# Patient Record
Sex: FEMALE | Race: BLACK OR AFRICAN AMERICAN | NOT HISPANIC OR LATINO | Employment: UNEMPLOYED | URBAN - METROPOLITAN AREA
[De-identification: names, ages, dates, MRNs, and addresses within clinical notes are randomized per-mention and may not be internally consistent; named-entity substitution may affect disease eponyms.]

---

## 2018-06-13 ENCOUNTER — OFFICE VISIT (OUTPATIENT)
Dept: OBSTETRICS AND GYNECOLOGY | Facility: CLINIC | Age: 23
End: 2018-06-13

## 2018-06-13 VITALS
DIASTOLIC BLOOD PRESSURE: 70 MMHG | HEART RATE: 84 BPM | HEIGHT: 62 IN | BODY MASS INDEX: 29.26 KG/M2 | SYSTOLIC BLOOD PRESSURE: 109 MMHG | WEIGHT: 159 LBS

## 2018-06-13 DIAGNOSIS — Z30.09 BIRTH CONTROL COUNSELING: Primary | ICD-10-CM

## 2018-06-13 PROCEDURE — 99213 OFFICE O/P EST LOW 20 MIN: CPT | Performed by: OBSTETRICS & GYNECOLOGY

## 2018-06-13 NOTE — PROGRESS NOTES
"Subjective   Ladonna Gonzalez is a 23 y.o. female.   CC: Pt here for bc consult. Pt would like Nexplanon.  History of Present Illness   Pt here for bc consult. Pt would like Nexplanon.  She has not been on any birth control since the delivery of her last child about 2 years ago.  She previously only took birth control pills for about 1 month before that.  Patient thinks that she would like to have the Nexplanon device.  She does not have any) or relatives that use the Nexplanon.  She reports that her periods are regular.  Her flow is moderate.  Her pain is minimal with her menstrual cycle.  She does not think that she would do well with taking pills every day, and she does not want to have to come into the office frequently such as for Depo-Provera shots.    The following portions of the patient's history were reviewed and updated as appropriate: allergies, current medications, past family history, past medical history, past social history, past surgical history and problem list.    Review of Systems  General: No fever or chills  Abdomen: No nausea, vomiting, constipation or diarrhea  : No dysuria, no hematuria  Skin: No rashes  Lymph: No swelling  Neuro: No parathesia, no weakness  Psych: Normal though content, no hallucinations, no SI/HI    Objective   Physical Exam  Vitals:    06/13/18 1344   BP: 109/70   Pulse: 84   Weight: 72.1 kg (159 lb)   Height: 157.5 cm (62\")   Patient's last menstrual period was 05/21/2018 (exact date).   Gen: No acute distress, awake and oriented times three  Pelvic:  Deferred  Psych: Good judgement and insight, normal affect and mood      Assessment/Plan   Diagnoses and all orders for this visit:    Birth control counseling    Various forms of contraception were discussed.  The patient has no stitches in the Nexplanon.  The risks, benefits, and side effects were discussed at length.  We also discussed the irregular bleeding profile that is typically seen with the patient sees Nexplanon. "  Educational handouts have been provided to the patient.  She'll like to proceed with planning for Nexplanon insertion.  We will obtain prior authorization from her insurance carrier.  I explained the need to insert the Nexplanon within 7 days to the start of her menstrual cycle.  She verbalizes understanding.  Patient will be due for a Pap smear around July 2019.    I spent 12 out of 15 minutes with the patient in face to face counseling of the above issues.

## 2018-06-28 ENCOUNTER — TELEPHONE (OUTPATIENT)
Dept: OBSTETRICS AND GYNECOLOGY | Facility: CLINIC | Age: 23
End: 2018-06-28

## 2018-06-28 NOTE — TELEPHONE ENCOUNTER
Tried calling pt back. Pt not excepting calls right now. Need to inform pt that her Nexplanon is at [facility]. They will call her to confirm shipping. Pt will need to authorize them to ship us her device to our office. SIDNEY

## 2018-06-28 NOTE — TELEPHONE ENCOUNTER
----- Message from Kimberly Chacon sent at 6/28/2018 12:47 PM EDT -----  Patient was calling to check the status of her Nexplanon.

## 2018-08-07 ENCOUNTER — OFFICE VISIT (OUTPATIENT)
Dept: OBSTETRICS AND GYNECOLOGY | Facility: CLINIC | Age: 23
End: 2018-08-07

## 2018-08-07 VITALS
HEIGHT: 62 IN | HEART RATE: 76 BPM | DIASTOLIC BLOOD PRESSURE: 69 MMHG | BODY MASS INDEX: 29.81 KG/M2 | WEIGHT: 162 LBS | SYSTOLIC BLOOD PRESSURE: 110 MMHG

## 2018-08-07 DIAGNOSIS — Z30.09 BIRTH CONTROL COUNSELING: ICD-10-CM

## 2018-08-07 DIAGNOSIS — Z11.3 SCREEN FOR STD (SEXUALLY TRANSMITTED DISEASE): Primary | ICD-10-CM

## 2018-08-07 PROCEDURE — 99212 OFFICE O/P EST SF 10 MIN: CPT | Performed by: OBSTETRICS & GYNECOLOGY

## 2018-08-07 NOTE — PROGRESS NOTES
"Subjective   Ladonna Gonzalez is a 23 y.o. female.   CC: Pt here for std check.   History of Present Illness   Pt here for std check.  She states that she does not have any concerning symptoms or exposures, but she just wants to be screened.  She actually does not want to have STD blood work done.  She only wants to have cultures done.  The patient was here couple months ago to discuss birth control.  At the time she wanted to have a Nexplanon.  We got the Nexplanon ordered, but the patient states that the pharmacy told her that her insurance was in active.  She states that should be active now.  She was still like to proceed with Nexplanon insertion when ready.  She states that she is not currently sexually active because she does not have birth control.    The following portions of the patient's history were reviewed and updated as appropriate: allergies, current medications, past family history, past medical history, past social history, past surgical history and problem list.    Review of Systems  General: No fever or chills  Abdomen: No nausea, vomiting, constipation or diarrhea  : No dysuria, no hematuria  Skin: No rashes  Psych: Normal though content, no hallucinations, no SI/HI    Objective   Physical Exam  Vitals:    08/07/18 1110   BP: 110/69   Pulse: 76   Weight: 73.5 kg (162 lb)   Height: 157.5 cm (62\")   Patient's last menstrual period was 07/18/2018 (exact date).   Gen: No acute distress, awake and oriented times three  Abdomen: soft, nontender, non distended, normoactive bowel sounds  Pelvic: Exam performed in the presence of a female chaperone  Patient has provided verbal consent to proceed with exam.  Normal external female genitalia, no lesions  Vagina: No blood or discharge  Cervix: No cervical motion tenderness, no lesions, no active bleeding, nonfriable  Bimanual: Deferred  Psych: Good judgement and insight, normal affect and mood      Assessment/Plan   Diagnoses and all orders for this " visit:    Screen for STD (sexually transmitted disease)  -     Chlamydia trachomatis, Neisseria gonorrhoeae, Trichomonas vaginalis, PCR - Swab, Vagina        -     Patient states she is not currently sexually active.  We discussed the importance of condoms for STD prevention if she is to be sexually active. She is instructed to call our office for the results if she has not heard them after 1 week.   Birth control counseling  We will resubmit insurance information to try to obtain Nexplanon.  We'll contact the patient after we have received the device.

## 2018-08-09 LAB
C TRACH RRNA SPEC QL NAA+PROBE: NEGATIVE
N GONORRHOEA RRNA SPEC QL NAA+PROBE: NEGATIVE
T VAGINALIS RRNA SPEC QL NAA+PROBE: NEGATIVE

## 2018-08-13 ENCOUNTER — TELEPHONE (OUTPATIENT)
Dept: OBSTETRICS AND GYNECOLOGY | Facility: CLINIC | Age: 23
End: 2018-08-13

## 2018-08-17 ENCOUNTER — TELEPHONE (OUTPATIENT)
Dept: OBSTETRICS AND GYNECOLOGY | Facility: CLINIC | Age: 23
End: 2018-08-17

## 2019-07-18 ENCOUNTER — TELEPHONE (OUTPATIENT)
Dept: OBSTETRICS AND GYNECOLOGY | Facility: CLINIC | Age: 24
End: 2019-07-18

## 2023-03-08 NOTE — TELEPHONE ENCOUNTER
2/3/2023  [facility]   Lilia Mendoza MD  Endocrinology, Diabetes, and Metabolism        Pt called, requesting the results from her std screening. Please advise.     Pt callback:[phone]